# Patient Record
Sex: MALE | Race: WHITE
[De-identification: names, ages, dates, MRNs, and addresses within clinical notes are randomized per-mention and may not be internally consistent; named-entity substitution may affect disease eponyms.]

---

## 2019-10-06 ENCOUNTER — HOSPITAL ENCOUNTER (EMERGENCY)
Dept: HOSPITAL 96 - M.ERS | Age: 34
Discharge: HOME | End: 2019-10-06
Payer: COMMERCIAL

## 2019-10-06 VITALS — SYSTOLIC BLOOD PRESSURE: 143 MMHG | DIASTOLIC BLOOD PRESSURE: 94 MMHG

## 2019-10-06 VITALS — WEIGHT: 219.01 LBS | BODY MASS INDEX: 33.19 KG/M2 | HEIGHT: 68 IN

## 2019-10-06 DIAGNOSIS — R07.89: Primary | ICD-10-CM

## 2019-10-06 DIAGNOSIS — Z88.2: ICD-10-CM

## 2019-10-06 DIAGNOSIS — Z88.1: ICD-10-CM

## 2019-10-06 DIAGNOSIS — R42: ICD-10-CM

## 2019-10-06 LAB
ABSOLUTE BASOPHILS: 0.1 THOU/UL (ref 0–0.2)
ABSOLUTE EOSINOPHILS: 0.2 THOU/UL (ref 0–0.7)
ABSOLUTE MONOCYTES: 0.7 THOU/UL (ref 0–1.2)
ALBUMIN SERPL-MCNC: 4 G/DL (ref 3.4–5)
ALP SERPL-CCNC: 86 U/L (ref 46–116)
ALT SERPL-CCNC: 79 U/L (ref 30–65)
ANION GAP SERPL CALC-SCNC: 12 MMOL/L (ref 7–16)
AST SERPL-CCNC: 76 U/L (ref 15–37)
BASOPHILS NFR BLD AUTO: 0.9 %
BILIRUB SERPL-MCNC: 0.3 MG/DL
BUN SERPL-MCNC: 20 MG/DL (ref 7–18)
CALCIUM SERPL-MCNC: 9 MG/DL (ref 8.5–10.1)
CHLORIDE SERPL-SCNC: 99 MMOL/L (ref 98–107)
CO2 SERPL-SCNC: 26 MMOL/L (ref 21–32)
CREAT SERPL-MCNC: 1 MG/DL (ref 0.6–1.3)
EOSINOPHIL NFR BLD: 2.1 %
GLUCOSE SERPL-MCNC: 138 MG/DL (ref 70–99)
GRANULOCYTES NFR BLD MANUAL: 61.7 %
HCT VFR BLD CALC: 42.8 % (ref 42–52)
HGB BLD-MCNC: 14.7 GM/DL (ref 14–18)
LIPASE: 210 U/L (ref 73–393)
LYMPHOCYTES # BLD: 2.3 THOU/UL (ref 0.8–5.3)
LYMPHOCYTES NFR BLD AUTO: 26.9 %
MAGNESIUM SERPL-MCNC: 1.7 MG/DL (ref 1.8–2.4)
MCH RBC QN AUTO: 30.6 PG (ref 26–34)
MCHC RBC AUTO-ENTMCNC: 34.4 G/DL (ref 28–37)
MCV RBC: 89 FL (ref 80–100)
MONOCYTES NFR BLD: 8.4 %
MPV: 7 FL. (ref 7.2–11.1)
NEUTROPHILS # BLD: 5.2 THOU/UL (ref 1.6–8.1)
NT-PRO BRAIN NAT PEPTIDE: 7 PG/ML (ref ?–300)
NUCLEATED RBCS: 0 /100WBC
PLATELET COUNT*: 359 THOU/UL (ref 150–400)
POTASSIUM SERPL-SCNC: 3.9 MMOL/L (ref 3.5–5.1)
PROT SERPL-MCNC: 7.5 G/DL (ref 6.4–8.2)
RBC # BLD AUTO: 4.81 MIL/UL (ref 4.5–6)
RDW-CV: 14.2 % (ref 10.5–14.5)
SODIUM SERPL-SCNC: 137 MMOL/L (ref 136–145)
TROPONIN-I LEVEL: <0.06 NG/ML (ref ?–0.06)
WBC # BLD AUTO: 8.5 THOU/UL (ref 4–11)

## 2019-10-07 NOTE — EKG
[place] [address]
Phone:  [phone]                     ELECTROCARDIOGRAM REPORT      
_______________________________________________________________________________
 
Name:       SHRUTHI RANDALL               Room:                      [facility]#:  [id number]      Account #:      [id number]  
Admission:  10/06/19     Attend Phys:                         
Discharge:  10/06/19     Date of Birth:  [birth year]  
         Report #: [id number]
    [id number]
_______________________________________________________________________________
THIS REPORT FOR:  //name//                      
 
                         [facility] ED
                                       
Test Date:    2019-10-06               Test Time:    17:54:56
Pat Name:     SHRUTHI RANDALL           Department:   
Patient ID:   [id number]            Room:          
Gender:       M                        Technician:   PAVITHRA
:          1985               Requested By: Sachin Winchester
Order Number: [id number] MD:   Aren Early
                                 Measurements
Intervals                              Axis          
Rate:         120                      P:            60
AZ:           148                      QRS:          62
QRSD:         94                       T:            -25
QT:           313                                    
QTc:          443                                    
                           Interpretive Statements
Sinus tachycardia
Probable left atrial enlargement
Borderline T abnormalities, diffuse leads
Baseline wander in lead(s) II,III
Compared to ECG 2011 17:53:59
T-wave abnormality now present
Sinus rate has increased
Electronically Signed On 10-7-2019 16:20:26 CDT by Aren Early
[email or web address]
 
 
 
 
 
 
 
 
 
 
 
 
 
 
 
 
  <ELECTRONICALLY SIGNED>
                                           By: Aren Early MD, FAC     
  10/07/19     1620
D: 10/06/19 1754   _____________________________________
T: 10/06/19 1754   Aren Early MD, [facility]       /EPI

## 2019-11-04 ENCOUNTER — HOSPITAL ENCOUNTER (OUTPATIENT)
Dept: HOSPITAL 96 - M.CRD | Age: 34
End: 2019-11-04
Attending: FAMILY MEDICINE
Payer: COMMERCIAL

## 2019-11-04 DIAGNOSIS — R61: ICD-10-CM

## 2019-11-04 DIAGNOSIS — E78.5: ICD-10-CM

## 2019-11-04 DIAGNOSIS — I10: ICD-10-CM

## 2019-11-04 DIAGNOSIS — R91.8: Primary | ICD-10-CM

## 2019-11-04 DIAGNOSIS — R42: ICD-10-CM

## 2019-11-04 DIAGNOSIS — F17.200: ICD-10-CM

## 2019-11-04 DIAGNOSIS — Z82.49: ICD-10-CM

## 2019-11-06 NOTE — TST
[place] [address]
Phone:  [phone]                     TREADMI[facility] STRESS TEST         
_______________________________________________________________________________
 
Name:         SHRUTHI RANDALL              Room:                     [facility]#:    [id number]     Account #:     [id number]  
Admission:    11/04/19    Attend Phys:   Leighton Nolen DO   
Discharge:                Date of Birth: [birth year]  
Date of Service: 11/04/19 1658  Report #:      [id number]
        [id number]     
_______________________________________________________________________________
THIS REPORT FOR:  //name//                      
 
CC: Leighton Nolen DO
 
INDICATION:  Chest pain, hypertension, dizziness and diaphoresis.
 
CARDIAC RISK FACTORS:  Hyperlipidemia, hypertension, tobacco use and family
history of coronary artery disease.
 
CARDIAC MEDICATIONS:  Lisinopril.
 
The patient exercised per standard [place] protocol for 10 minutes and 8 seconds. 
The patient achieved 100% of the age predicted maximum heart rate and an energy
expenditure equivalent to 12.01 METS.  The resting heart rate was 102 beats per
minute with a resting BP of 124/76 mmHg.  At peak exercise, the heart rate was
185 beats per minute with a peak stress blood pressure of 136/64 mmHg.  In
recovery, the heart rate was 103 beats per minute with a recovery blood pressure
was 112/80 mmHg.
 
The baseline 12-lead EKG shows sinus rhythm without significant ST or T-wave
abnormality.  EKGs obtained during and post-exercise stress shows sinus rhythm
and sinus tachycardia with no significant ST or T-wave changes when compared to
baseline.
 
The patient had no clinical symptoms to suggest angina.  Exercise was stopped
due to attainment of target heart rate.  The patient exhibited good exercise
tolerance.
 
IMPRESSION:
1.  Clinical response, nonischemic.
2.  EKG response, nonischemic.
 
CONCLUSION:  This standard [place] protocol exercise stress test shows no EKG or
clinical evidence to suggest ischemia.  This is a low-risk study.
 
 
 
 
 
 
 
 
 
  <ELECTRONICALLY SIGNED>
                                           By: Jose Morris MD, FACC   
  11/06/19     0817
D: 11/04/19 1658   _____________________________________
T: 11/04/19 1915   Jose Morris MD, FACC     /nt

## 2021-12-13 ENCOUNTER — HOSPITAL ENCOUNTER (EMERGENCY)
Dept: HOSPITAL 96 - M.ERS | Age: 36
Discharge: HOME | End: 2021-12-13
Payer: COMMERCIAL

## 2021-12-13 VITALS — WEIGHT: 185.01 LBS | HEIGHT: 68 IN | BODY MASS INDEX: 28.04 KG/M2

## 2021-12-13 VITALS — SYSTOLIC BLOOD PRESSURE: 130 MMHG | DIASTOLIC BLOOD PRESSURE: 88 MMHG

## 2021-12-13 DIAGNOSIS — Z88.2: ICD-10-CM

## 2021-12-13 DIAGNOSIS — Z88.1: ICD-10-CM

## 2021-12-13 DIAGNOSIS — R51.9: Primary | ICD-10-CM

## 2021-12-13 DIAGNOSIS — F17.210: ICD-10-CM

## 2021-12-13 DIAGNOSIS — Z20.822: ICD-10-CM

## 2021-12-13 DIAGNOSIS — M54.2: ICD-10-CM

## 2021-12-13 LAB
ABSOLUTE BASOPHILS: 0.1 THOU/UL (ref 0–0.2)
ABSOLUTE EOSINOPHILS: 0.2 THOU/UL (ref 0–0.7)
ABSOLUTE MONOCYTES: 0.8 THOU/UL (ref 0–1.2)
ALBUMIN SERPL-MCNC: 4 G/DL (ref 3.4–5)
ALP SERPL-CCNC: 77 U/L (ref 46–116)
ALT SERPL-CCNC: 46 U/L (ref 30–65)
ANION GAP SERPL CALC-SCNC: 11 MMOL/L (ref 7–16)
AST SERPL-CCNC: 26 U/L (ref 15–37)
BASOPHILS NFR BLD AUTO: 0.8 %
BILIRUB SERPL-MCNC: 0.3 MG/DL
BUN SERPL-MCNC: 13 MG/DL (ref 7–18)
CALCIUM SERPL-MCNC: 9 MG/DL (ref 8.5–10.1)
CHLORIDE SERPL-SCNC: 103 MMOL/L (ref 98–107)
CO2 SERPL-SCNC: 26 MMOL/L (ref 21–32)
CREAT SERPL-MCNC: 0.8 MG/DL (ref 0.6–1.3)
EOSINOPHIL NFR BLD: 2.8 %
GLUCOSE SERPL-MCNC: 85 MG/DL (ref 70–99)
GRANULOCYTES NFR BLD MANUAL: 55.7 %
HCT VFR BLD CALC: 40.5 % (ref 42–52)
HGB BLD-MCNC: 13.6 GM/DL (ref 14–18)
LYMPHOCYTES # BLD: 2.3 THOU/UL (ref 0.8–5.3)
LYMPHOCYTES NFR BLD AUTO: 29.7 %
MCH RBC QN AUTO: 28.9 PG (ref 26–34)
MCHC RBC AUTO-ENTMCNC: 33.7 G/DL (ref 28–37)
MCV RBC: 85.7 FL (ref 80–100)
MONOCYTES NFR BLD: 11 %
MPV: 7.4 FL. (ref 7.2–11.1)
NEUTROPHILS # BLD: 4.2 THOU/UL (ref 1.6–8.1)
NUCLEATED RBCS: 0 /100WBC
PLATELET COUNT*: 346 THOU/UL (ref 150–400)
POTASSIUM SERPL-SCNC: 3.7 MMOL/L (ref 3.5–5.1)
PROT SERPL-MCNC: 6.7 G/DL (ref 6.4–8.2)
RBC # BLD AUTO: 4.72 MIL/UL (ref 4.5–6)
RDW-CV: 14 % (ref 10.5–14.5)
SODIUM SERPL-SCNC: 140 MMOL/L (ref 136–145)
WBC # BLD AUTO: 7.6 THOU/UL (ref 4–11)

## 2021-12-14 NOTE — EKG
[place] [address]
Phone:  [phone]                     ELECTROCARDIOGRAM REPORT      
_______________________________________________________________________________
 
Name:         SHRUTHI RANDALL              Room:                     [facility]#:    [id number]     Account #:     [id number]  
Admission:    21    Attend Phys:                     
Discharge:    21    Date of Birth: [birth year]  
Date of Service: 21  Report #:      [id number]
        [id number]
_______________________________________________________________________________
THIS REPORT FOR:  //name//                      
 
                         [facility] ED
                                       
Test Date:    2021               Test Time:    18:36:11
Pat Name:     SHRUTHI RANDALL           Department:   
Patient ID:   [id number]            Room:          
Gender:                               Technician:   [facility]
:          1985               Requested By: Dany Pagan
Order Number: [id number] MD:   Jose Morris
                                 Measurements
Intervals                              Axis          
Rate:         77                       P:            44
NE:           149                      QRS:          69
QRSD:         101                      T:            37
QT:           397                                    
QTc:          450                                    
                           Interpretive Statements
Sinus rhythm
Compared to ECG 10/06/2019 17:54:56
Sinus tachycardia no longer present
T-wave abnormality no longer present
Electronically Signed On 2021 8:59:31 CST by Jose Morris
[email or web address]
 
 
 
 
 
 
 
 
 
 
 
 
 
 
 
 
 
 
 
 
  <ELECTRONICALLY SIGNED>
                                           By: Jose Morris MD, FACC   
  21     0859
D: 21 1836   _____________________________________
T: 21 1836   Jose Morris MD, FAC     /EPI